# Patient Record
Sex: FEMALE | Race: BLACK OR AFRICAN AMERICAN | NOT HISPANIC OR LATINO | Employment: OTHER | URBAN - METROPOLITAN AREA
[De-identification: names, ages, dates, MRNs, and addresses within clinical notes are randomized per-mention and may not be internally consistent; named-entity substitution may affect disease eponyms.]

---

## 2019-11-05 ENCOUNTER — OFFICE VISIT (OUTPATIENT)
Dept: UROLOGY | Facility: CLINIC | Age: 74
End: 2019-11-05
Payer: COMMERCIAL

## 2019-11-05 DIAGNOSIS — R32 FEMALE INCONTINENCE: Primary | ICD-10-CM

## 2019-11-05 LAB
BILIRUB UR QL STRIP: NEGATIVE
GLUCOSE UR QL STRIP: NEGATIVE
KETONES UR QL STRIP: NEGATIVE
LEUKOCYTE ESTERASE UR QL STRIP: POSITIVE
PH, POC UA: 5.5
POC AMORP, URINE: ABNORMAL
POC BACTI, URINE: ABNORMAL
POC BLOOD, URINE: POSITIVE
POC CASTS, URINE: ABNORMAL
POC CRYST, URINE: ABNORMAL
POC EPITH, URINE: ABNORMAL
POC HCG, URINE: ABNORMAL
POC HYALIN, URINE: ABNORMAL
POC MUCUS, URINE: ABNORMAL
POC NITRATES, URINE: NEGATIVE
POC OTHER, URINE: ABNORMAL
POC RBC, URINE: ABNORMAL HPF
POC RESIDUAL URINE VOLUME: 63 ML (ref 0–100)
POC WBC, URINE: ABNORMAL HPF
PROT UR QL STRIP: POSITIVE
SP GR UR STRIP: >=1.03 (ref 1–1.03)
UROBILINOGEN UR STRIP-ACNC: 0.2 (ref 0.1–1.1)

## 2019-11-05 PROCEDURE — 51798 US URINE CAPACITY MEASURE: CPT | Mod: S$GLB,,, | Performed by: UROLOGY

## 2019-11-05 PROCEDURE — 99024 POSTOP FOLLOW-UP VISIT: CPT | Mod: S$GLB,,, | Performed by: UROLOGY

## 2019-11-05 PROCEDURE — 99024 PR POST-OP FOLLOW-UP VISIT: ICD-10-PCS | Mod: S$GLB,,, | Performed by: UROLOGY

## 2019-11-05 PROCEDURE — 81000 POCT URINALYSIS (W/MICRO OPTION): ICD-10-PCS | Mod: S$GLB,,, | Performed by: UROLOGY

## 2019-11-05 PROCEDURE — 81000 URINALYSIS NONAUTO W/SCOPE: CPT | Mod: S$GLB,,, | Performed by: UROLOGY

## 2019-11-05 PROCEDURE — 51798 POCT BLADDER SCAN: ICD-10-PCS | Mod: S$GLB,,, | Performed by: UROLOGY

## 2019-11-05 RX ORDER — NITROFURANTOIN 25; 75 MG/1; MG/1
CAPSULE ORAL
Refills: 0 | COMMUNITY
Start: 2019-10-11 | End: 2020-11-09

## 2019-11-05 RX ORDER — AMLODIPINE BESYLATE 5 MG/1
TABLET ORAL
Refills: 0 | COMMUNITY
Start: 2019-10-18 | End: 2020-11-09

## 2019-11-05 RX ORDER — NAPROXEN 500 MG/1
TABLET ORAL
Refills: 0 | COMMUNITY
Start: 2019-08-29 | End: 2020-11-09

## 2019-11-05 RX ORDER — DIAZEPAM 5 MG/1
TABLET ORAL
Refills: 0 | COMMUNITY
Start: 2019-10-11 | End: 2020-11-09

## 2019-11-05 RX ORDER — METHYLPREDNISOLONE 4 MG/1
TABLET ORAL
Refills: 0 | COMMUNITY
Start: 2019-08-29 | End: 2020-11-09

## 2019-11-05 RX ORDER — FUROSEMIDE 20 MG/1
TABLET ORAL
Refills: 3 | COMMUNITY
Start: 2019-10-02 | End: 2020-11-09

## 2019-11-05 RX ORDER — OXYCODONE AND ACETAMINOPHEN 5; 325 MG/1; MG/1
TABLET ORAL
Refills: 0 | COMMUNITY
Start: 2019-10-11 | End: 2020-11-09

## 2019-11-05 RX ORDER — LISINOPRIL 5 MG/1
TABLET ORAL
Refills: 3 | COMMUNITY
Start: 2019-10-04 | End: 2020-11-09

## 2019-11-05 RX ORDER — ACETAMINOPHEN AND CODEINE PHOSPHATE 300; 30 MG/1; MG/1
TABLET ORAL
Refills: 0 | COMMUNITY
Start: 2019-09-27 | End: 2020-11-09

## 2019-11-05 NOTE — PROGRESS NOTES
Subjective:       Patient ID: Jennifer Young is a 74 y.o. female.    Chief Complaint: Other (fu from sling 10/3/19)      HPI: Post opp sling etc      Past Medical History:   Past Medical History:   Diagnosis Date    Acid reflux     Carpal tunnel syndrome of left wrist     H/O knee surgery     Hypertension        Past Surgical Historical:   Past Surgical History:   Procedure Laterality Date    BACK SURGERY      BLADDER SUSPENSION      HAND SURGERY      KNEE SURGERY      right replaced         Medications:   Medication List with Changes/Refills   Current Medications    ACETAMINOPHEN-CODEINE 300-30MG (TYLENOL #3) 300-30 MG TAB    TK 1 T PO  Q 4 H PRN P    AMLODIPINE (NORVASC) 5 MG TABLET    TK 1 T PO QAM    DIAZEPAM (VALIUM) 5 MG TABLET    TK 1 T PO  Q 8 H PRN FOR BLADDER SPASM    FUROSEMIDE (LASIX) 20 MG TABLET    TK 1 T PO ONCE A DAY    LISINOPRIL (PRINIVIL,ZESTRIL) 5 MG TABLET    TK 1 T PO ONCE D    METHYLPREDNISOLONE (MEDROL DOSEPACK) 4 MG TABLET    FPD    NAPROXEN (NAPROSYN) 500 MG TABLET    TK 1 T PO  BID    NITROFURANTOIN, MACROCRYSTAL-MONOHYDRATE, (MACROBID) 100 MG CAPSULE    TK ONE C PO BID    OXYCODONE-ACETAMINOPHEN (PERCOCET) 5-325 MG PER TABLET    TK 1 T PO Q 6 H PRN P        Past Social History:   Social History     Socioeconomic History    Marital status:      Spouse name: Not on file    Number of children: Not on file    Years of education: Not on file    Highest education level: Not on file   Occupational History    Not on file   Social Needs    Financial resource strain: Not on file    Food insecurity:     Worry: Not on file     Inability: Not on file    Transportation needs:     Medical: Not on file     Non-medical: Not on file   Tobacco Use    Smoking status: Never Smoker   Substance and Sexual Activity    Alcohol use: Not on file    Drug use: Never    Sexual activity: Not Currently   Lifestyle    Physical activity:     Days per week: Not on file     Minutes per  session: Not on file    Stress: Not on file   Relationships    Social connections:     Talks on phone: Not on file     Gets together: Not on file     Attends Confucianism service: Not on file     Active member of club or organization: Not on file     Attends meetings of clubs or organizations: Not on file     Relationship status: Not on file   Other Topics Concern    Not on file   Social History Narrative    Not on file       Allergies: Review of patient's allergies indicates:  No Known Allergies     Family History:   Family History   Problem Relation Age of Onset    Cancer Father     Lung cancer Mother     Breast cancer Sister         Review of Systems:  Review of Systems   Constitutional: Negative for activity change and appetite change.   HENT: Negative for congestion and dental problem.    Respiratory: Negative for chest tightness and shortness of breath.    Cardiovascular: Negative for chest pain.   Gastrointestinal: Negative for abdominal distention.   Genitourinary: Negative for decreased urine volume, difficulty urinating, dyspareunia, dysuria, enuresis, flank pain, frequency, genital sores, hematuria, pelvic pain and urgency.   Musculoskeletal: Negative for back pain and neck pain.   Neurological: Negative for dizziness.   Hematological: Negative for adenopathy.   Psychiatric/Behavioral: Negative for agitation, behavioral problems and confusion.       Physical Exam:  Physical Exam   Nursing note and vitals reviewed.  Constitutional: She is oriented to person, place, and time. She appears well-developed and well-nourished.   HENT:   Head: Normocephalic.   Cardiovascular: Normal rate, regular rhythm and normal heart sounds.    Pulmonary/Chest: Effort normal and breath sounds normal.   Abdominal: Soft. Bowel sounds are normal.   Genitourinary:       Pelvic exam was performed with patient prone.   Neurological: She is alert and oriented to person, place, and time.   Skin: Skin is warm and dry.          Assessment/Plan:     sp sling etc doing well fu 4 weeks  Problem List Items Addressed This Visit     None

## 2019-12-03 ENCOUNTER — OFFICE VISIT (OUTPATIENT)
Dept: UROLOGY | Facility: CLINIC | Age: 74
End: 2019-12-03
Payer: COMMERCIAL

## 2019-12-03 VITALS
WEIGHT: 240 LBS | DIASTOLIC BLOOD PRESSURE: 80 MMHG | SYSTOLIC BLOOD PRESSURE: 125 MMHG | BODY MASS INDEX: 40.97 KG/M2 | HEIGHT: 64 IN | HEART RATE: 65 BPM

## 2019-12-03 DIAGNOSIS — N81.6 RECTOCELE: ICD-10-CM

## 2019-12-03 DIAGNOSIS — R39.15 URINARY URGENCY: ICD-10-CM

## 2019-12-03 DIAGNOSIS — N39.3 STRESS INCONTINENCE: Primary | ICD-10-CM

## 2019-12-03 DIAGNOSIS — N81.10 FEMALE CYSTOCELE: ICD-10-CM

## 2019-12-03 LAB
BILIRUB UR QL STRIP: NEGATIVE
GLUCOSE UR QL STRIP: NEGATIVE
KETONES UR QL STRIP: NEGATIVE
LEUKOCYTE ESTERASE UR QL STRIP: NEGATIVE
PH, POC UA: 5.5
POC AMORP, URINE: ABNORMAL
POC BACTI, URINE: ABNORMAL
POC BLOOD, URINE: POSITIVE
POC CASTS, URINE: ABNORMAL
POC CRYST, URINE: ABNORMAL
POC EPITH, URINE: ABNORMAL
POC HCG, URINE: ABNORMAL
POC HYALIN, URINE: ABNORMAL
POC MUCUS, URINE: ABNORMAL
POC NITRATES, URINE: NEGATIVE
POC OTHER, URINE: ABNORMAL
POC RBC, URINE: ABNORMAL HPF
POC WBC, URINE: ABNORMAL HPF
PROT UR QL STRIP: POSITIVE
SP GR UR STRIP: 1.02 (ref 1–1.03)
UROBILINOGEN UR STRIP-ACNC: 0.2 (ref 0.1–1.1)

## 2019-12-03 PROCEDURE — 99024 PR POST-OP FOLLOW-UP VISIT: ICD-10-PCS | Mod: S$GLB,,, | Performed by: NURSE PRACTITIONER

## 2019-12-03 PROCEDURE — 99024 POSTOP FOLLOW-UP VISIT: CPT | Mod: S$GLB,,, | Performed by: NURSE PRACTITIONER

## 2019-12-03 RX ORDER — OXYBUTYNIN CHLORIDE 5 MG/1
5 TABLET, EXTENDED RELEASE ORAL DAILY
Qty: 30 TABLET | Refills: 11 | Status: SHIPPED | OUTPATIENT
Start: 2019-12-03 | End: 2020-11-09

## 2019-12-03 NOTE — PROGRESS NOTES
Subjective:       Patient ID: Jennifer Young is a 74 y.o. female.    Chief Complaint: Other (fu from sling/ leaks when getting up )      HPI: 74-year-old female presents for a week postop sling with anterior and posterior repair, secondary to stress incontinence, cystocele, and rectocele.  Patient states she is doing well, for the most part.  Patient does states she will have episodes of leakage.  States she will get up at night, and when she stands up she will have some leakage.  She states that she is laying on the couch and she gets up, she will have some leakage.  Patient also states that when at [employer] she needs to sit near the bathroom, or she will not get there in time.  Denies any pain or burning with urination.  Denies any difficulty voiding.  Denies any odor.  Denies seeing blood.  No other urinary complaints.  All other health problems are stable.      Past Medical History:   Past Medical History:   Diagnosis Date    Acid reflux     Carpal tunnel syndrome of left wrist     H/O knee surgery     Hypertension        Past Surgical Historical:   Past Surgical History:   Procedure Laterality Date    BACK SURGERY      BLADDER SUSPENSION      BLADDER SUSPENSION      HAND SURGERY      KNEE SURGERY      right replaced         Medications:   Medication List with Changes/Refills   New Medications    OXYBUTYNIN (DITROPAN-XL) 5 MG TR24    Take 1 tablet (5 mg total) by mouth once daily.   Current Medications    ACETAMINOPHEN-CODEINE 300-30MG (TYLENOL #3) 300-30 MG TAB    TK 1 T PO  Q 4 H PRN P    AMLODIPINE (NORVASC) 5 MG TABLET    TK 1 T PO QAM    DIAZEPAM (VALIUM) 5 MG TABLET    TK 1 T PO  Q 8 H PRN FOR BLADDER SPASM    FUROSEMIDE (LASIX) 20 MG TABLET    TK 1 T PO ONCE A DAY    LISINOPRIL (PRINIVIL,ZESTRIL) 5 MG TABLET    TK 1 T PO ONCE D    METHYLPREDNISOLONE (MEDROL DOSEPACK) 4 MG TABLET    FPD    NAPROXEN (NAPROSYN) 500 MG TABLET    TK 1 T PO  BID    NITROFURANTOIN, MACROCRYSTAL-MONOHYDRATE,  (MACROBID) 100 MG CAPSULE    TK ONE C PO BID    OXYCODONE-ACETAMINOPHEN (PERCOCET) 5-325 MG PER TABLET    TK 1 T PO Q 6 H PRN P        Past Social History:   Social History     Socioeconomic History    Marital status:      Spouse name: Not on file    Number of children: Not on file    Years of education: Not on file    Highest education level: Not on file   Occupational History    Not on file   Social Needs    Financial resource strain: Not on file    Food insecurity:     Worry: Not on file     Inability: Not on file    Transportation needs:     Medical: Not on file     Non-medical: Not on file   Tobacco Use    Smoking status: Never Smoker   Substance and Sexual Activity    Alcohol use: Not on file    Drug use: Never    Sexual activity: Not Currently   Lifestyle    Physical activity:     Days per week: Not on file     Minutes per session: Not on file    Stress: Not on file   Relationships    Social connections:     Talks on phone: Not on file     Gets together: Not on file     Attends [employer] service: Not on file     Active member of club or organization: Not on file     Attends meetings of clubs or organizations: Not on file     Relationship status: Not on file   Other Topics Concern    Not on file   Social History Narrative    Not on file       Allergies: Review of patient's allergies indicates:  No Known Allergies     Family History:   Family History   Problem Relation Age of Onset    Cancer Father     Lung cancer Mother     Breast cancer Sister         Review of Systems:  Review of Systems   Constitutional: Negative for activity change and appetite change.        Ambulates with a walker.   HENT: Negative for congestion and dental problem.    Respiratory: Negative for chest tightness and shortness of breath.    Cardiovascular: Negative for chest pain.   Gastrointestinal: Negative for abdominal distention and abdominal pain.   Genitourinary: Positive for urgency. Negative for decreased  urine volume, difficulty urinating, dyspareunia, dysuria, enuresis, flank pain, frequency, genital sores, hematuria and pelvic pain.   Musculoskeletal: Negative for back pain and neck pain.   Allergic/Immunologic: Negative for immunocompromised state.   Neurological: Negative for dizziness.   Hematological: Negative for adenopathy.   Psychiatric/Behavioral: Negative for agitation, behavioral problems and confusion.       Physical Exam:  Physical Exam   Nursing note and vitals reviewed.  Constitutional: She is oriented to person, place, and time. She appears well-developed and well-nourished.   HENT:   Head: Normocephalic.   Eyes: Pupils are equal, round, and reactive to light.   Neck: Normal range of motion. Neck supple.   Cardiovascular: Normal rate, regular rhythm and normal heart sounds.    Pulmonary/Chest: Effort normal and breath sounds normal.   Abdominal: Soft. Bowel sounds are normal.   Genitourinary:   Genitourinary Comments: On pelvic exam, patient has good support.   Musculoskeletal: Normal range of motion.   Neurological: She is alert and oriented to person, place, and time.   Skin: Skin is warm and dry.     Psychiatric: She has a normal mood and affect. Her behavior is normal.     Bladder scan:  63 cc  Urinalysis:  Trace blood, red blood cells 0-2.  White blood cells 0-2, +1 epithelials, +1 bacteria.    Assessment/Plan:   1.  Stress incontinence with cystocele and rectocele:  Will put the patient on Ditropan 5 mg a day.  2.  Urgency:  We discussed timed voiding and double voiding.  Also discussed not holding her bladder.  Jaime scheduled for follow-up in 4 weeks for re-evaluation.  Problem List Items Addressed This Visit     None      Visit Diagnoses     Stress incontinence    -  Primary    Relevant Medications    oxybutynin (DITROPAN-XL) 5 MG TR24    Other Relevant Orders    POCT Urinalysis (w/Micro Option)    Bladder scan    Female cystocele        Rectocele        Urinary urgency

## 2020-01-27 ENCOUNTER — OFFICE VISIT (OUTPATIENT)
Dept: UROLOGY | Facility: CLINIC | Age: 75
End: 2020-01-27
Payer: COMMERCIAL

## 2020-01-27 VITALS
BODY MASS INDEX: 40.97 KG/M2 | HEART RATE: 85 BPM | HEIGHT: 64 IN | WEIGHT: 240 LBS | DIASTOLIC BLOOD PRESSURE: 88 MMHG | SYSTOLIC BLOOD PRESSURE: 126 MMHG

## 2020-01-27 DIAGNOSIS — N39.3 STRESS INCONTINENCE: Primary | ICD-10-CM

## 2020-01-27 LAB
BILIRUB SERPL-MCNC: NEGATIVE MG/DL
BLOOD URINE, POC: NEGATIVE
COLOR, POC UA: NORMAL
GLUCOSE UR QL STRIP: NEGATIVE
KETONES UR QL STRIP: NORMAL
LEUKOCYTE ESTERASE URINE, POC: NEGATIVE
NITRITE, POC UA: NEGATIVE
PH, POC UA: 5
POC RESIDUAL URINE VOLUME: 46 ML (ref 0–100)
PROTEIN, POC: NORMAL
SPECIFIC GRAVITY, POC UA: >=1.03
UROBILINOGEN, POC UA: 0.2

## 2020-01-27 PROCEDURE — 51798 US URINE CAPACITY MEASURE: CPT | Mod: S$GLB,,, | Performed by: UROLOGY

## 2020-01-27 PROCEDURE — 1159F MED LIST DOCD IN RCRD: CPT | Mod: S$GLB,,, | Performed by: UROLOGY

## 2020-01-27 PROCEDURE — 99213 OFFICE O/P EST LOW 20 MIN: CPT | Mod: 25,S$GLB,, | Performed by: UROLOGY

## 2020-01-27 PROCEDURE — 51798 POCT BLADDER SCAN: ICD-10-PCS | Mod: S$GLB,,, | Performed by: UROLOGY

## 2020-01-27 PROCEDURE — 1159F PR MEDICATION LIST DOCUMENTED IN MEDICAL RECORD: ICD-10-PCS | Mod: S$GLB,,, | Performed by: UROLOGY

## 2020-01-27 PROCEDURE — 99213 PR OFFICE/OUTPT VISIT, EST, LEVL III, 20-29 MIN: ICD-10-PCS | Mod: 25,S$GLB,, | Performed by: UROLOGY

## 2020-01-27 PROCEDURE — 81002 POCT URINE DIPSTICK WITHOUT MICROSCOPE: ICD-10-PCS | Mod: S$GLB,,, | Performed by: UROLOGY

## 2020-01-27 PROCEDURE — 81002 URINALYSIS NONAUTO W/O SCOPE: CPT | Mod: S$GLB,,, | Performed by: UROLOGY

## 2020-01-27 NOTE — PROGRESS NOTES
Subjective:       Patient ID: Jennifer Young is a 74 y.o. female.    Chief Complaint: Follow-up (4 wk f/u) and Other (ditropan 5 mg not effectibe for pt, pt also believes she might have slight reaction to ditropan has been having slight itch.)      HPI: 75 yo female still leaking urine--she has had a sling etc.  Placed on low dose ditropan with no improvement--actually has side effects from med       Past Medical History:   Past Medical History:   Diagnosis Date    Acid reflux     Carpal tunnel syndrome of left wrist     H/O knee surgery     Hypertension        Past Surgical Historical:   Past Surgical History:   Procedure Laterality Date    BACK SURGERY      BLADDER SUSPENSION      BLADDER SUSPENSION      HAND SURGERY      KNEE SURGERY      right replaced         Medications:   Medication List with Changes/Refills   Current Medications    ACETAMINOPHEN-CODEINE 300-30MG (TYLENOL #3) 300-30 MG TAB    TK 1 T PO  Q 4 H PRN P    AMLODIPINE (NORVASC) 5 MG TABLET    TK 1 T PO QAM    DIAZEPAM (VALIUM) 5 MG TABLET    TK 1 T PO  Q 8 H PRN FOR BLADDER SPASM    FUROSEMIDE (LASIX) 20 MG TABLET    TK 1 T PO ONCE A DAY    LISINOPRIL (PRINIVIL,ZESTRIL) 5 MG TABLET    TK 1 T PO ONCE D    METHYLPREDNISOLONE (MEDROL DOSEPACK) 4 MG TABLET    FPD    NAPROXEN (NAPROSYN) 500 MG TABLET    TK 1 T PO  BID    NITROFURANTOIN, MACROCRYSTAL-MONOHYDRATE, (MACROBID) 100 MG CAPSULE    TK ONE C PO BID    OXYBUTYNIN (DITROPAN-XL) 5 MG TR24    Take 1 tablet (5 mg total) by mouth once daily.    OXYCODONE-ACETAMINOPHEN (PERCOCET) 5-325 MG PER TABLET    TK 1 T PO Q 6 H PRN P        Past Social History:   Social History     Socioeconomic History    Marital status:      Spouse name: Not on file    Number of children: Not on file    Years of education: Not on file    Highest education level: Not on file   Occupational History    Not on file   Social Needs    Financial resource strain: Not on file    Food insecurity:      Worry: Not on file     Inability: Not on file    Transportation needs:     Medical: Not on file     Non-medical: Not on file   Tobacco Use    Smoking status: Never Smoker   Substance and Sexual Activity    Alcohol use: Not on file    Drug use: Never    Sexual activity: Not Currently   Lifestyle    Physical activity:     Days per week: Not on file     Minutes per session: Not on file    Stress: Not on file   Relationships    Social connections:     Talks on phone: Not on file     Gets together: Not on file     Attends Yazidism service: Not on file     Active member of club or organization: Not on file     Attends meetings of clubs or organizations: Not on file     Relationship status: Not on file   Other Topics Concern    Not on file   Social History Narrative    Not on file       Allergies: Review of patient's allergies indicates:  No Known Allergies     Family History:   Family History   Problem Relation Age of Onset    Cancer Father     Lung cancer Mother     Breast cancer Sister         Review of Systems:  Review of Systems   Constitutional: Negative for activity change and appetite change.   HENT: Negative for congestion and dental problem.    Respiratory: Negative for chest tightness and shortness of breath.    Cardiovascular: Negative for chest pain.   Gastrointestinal: Negative for abdominal distention.   Genitourinary: Negative for decreased urine volume, difficulty urinating, dyspareunia, dysuria, enuresis, flank pain, frequency, genital sores, hematuria, pelvic pain and urgency.   Musculoskeletal: Negative for back pain and neck pain.   Allergic/Immunologic: Negative for immunocompromised state.   Neurological: Negative for dizziness.   Hematological: Negative for adenopathy.   Psychiatric/Behavioral: Negative for agitation, behavioral problems and confusion.       Physical Exam:  Physical Exam   Nursing note and vitals reviewed.  Constitutional: She is oriented to person, place, and time.  She appears well-developed and well-nourished.   HENT:   Head: Normocephalic.   Cardiovascular: Normal rate, regular rhythm and normal heart sounds.    Pulmonary/Chest: Effort normal and breath sounds normal.   Abdominal: Soft. Bowel sounds are normal.   Neurological: She is alert and oriented to person, place, and time.   Skin: Skin is warm and dry.     ua is normal    Assessment/Plan:     continued incontinence--will change to myrbetriq continued timed voiding fu in 3 weeks will repeat pelvic that day  Problem List Items Addressed This Visit     None      Visit Diagnoses     Stress incontinence    -  Primary    Relevant Orders    POCT Bladder Scan (Completed)    POCT urine dipstick without microscope

## 2020-02-17 ENCOUNTER — OFFICE VISIT (OUTPATIENT)
Dept: UROLOGY | Facility: CLINIC | Age: 75
End: 2020-02-17
Payer: COMMERCIAL

## 2020-02-17 VITALS
HEART RATE: 75 BPM | SYSTOLIC BLOOD PRESSURE: 118 MMHG | BODY MASS INDEX: 40.97 KG/M2 | HEIGHT: 64 IN | WEIGHT: 240 LBS | DIASTOLIC BLOOD PRESSURE: 80 MMHG

## 2020-02-17 DIAGNOSIS — N39.3 STRESS INCONTINENCE: Primary | ICD-10-CM

## 2020-02-17 PROCEDURE — 1159F PR MEDICATION LIST DOCUMENTED IN MEDICAL RECORD: ICD-10-PCS | Mod: S$GLB,,, | Performed by: UROLOGY

## 2020-02-17 PROCEDURE — 99214 OFFICE O/P EST MOD 30 MIN: CPT | Mod: S$GLB,,, | Performed by: UROLOGY

## 2020-02-17 PROCEDURE — 99214 PR OFFICE/OUTPT VISIT, EST, LEVL IV, 30-39 MIN: ICD-10-PCS | Mod: S$GLB,,, | Performed by: UROLOGY

## 2020-02-17 PROCEDURE — 1159F MED LIST DOCD IN RCRD: CPT | Mod: S$GLB,,, | Performed by: UROLOGY

## 2020-02-17 NOTE — PROGRESS NOTES
Subjective:       Patient ID: Jennifer Young is a 74 y.o. female.    Chief Complaint: Other (3 week fu mybetriq )      HPI: 4-year-old female, patient Dr. Cheek, presents for 3 week re-evaluation.  Patient has a history of stress incontinence.  She had a sling in October of 2019.  Patient was complaining of still have some incontinence after the procedure.    She was put on 5 mg Ditropan.  However, she was having itching.  Therefore, she was put on Myrbetriq 25 mg approximately 3 weeks ago.  Patient states that during the day she tends to do very well.  However at night any time she gets up she will have leakage.  The Myrbetriq has helped a little but not very much.    Patient denies pain or burning urination.  States he has a good stream.  Denies difficulty voiding.  Denies fever.  Denies flank pain.  Denies blood in her urine.  No other urinary complaints, all other health problems appear stable at this time.       Past Medical History:   Past Medical History:   Diagnosis Date    Acid reflux     Carpal tunnel syndrome of left wrist     H/O knee surgery     Hypertension        Past Surgical Historical:   Past Surgical History:   Procedure Laterality Date    APPENDECTOMY      BACK SURGERY      BLADDER SUSPENSION      BLADDER SUSPENSION      HAND SURGERY      KNEE SURGERY      right replaced     SMALL INTESTINE SURGERY          Medications:   Medication List with Changes/Refills   Current Medications    ACETAMINOPHEN-CODEINE 300-30MG (TYLENOL #3) 300-30 MG TAB    TK 1 T PO  Q 4 H PRN P    AMLODIPINE (NORVASC) 5 MG TABLET    TK 1 T PO QAM    DIAZEPAM (VALIUM) 5 MG TABLET    TK 1 T PO  Q 8 H PRN FOR BLADDER SPASM    FUROSEMIDE (LASIX) 20 MG TABLET    TK 1 T PO ONCE A DAY    LISINOPRIL (PRINIVIL,ZESTRIL) 5 MG TABLET    TK 1 T PO ONCE D    METHYLPREDNISOLONE (MEDROL DOSEPACK) 4 MG TABLET    FPD    NAPROXEN (NAPROSYN) 500 MG TABLET    TK 1 T PO  BID    NITROFURANTOIN, MACROCRYSTAL-MONOHYDRATE,  (MACROBID) 100 MG CAPSULE    TK ONE C PO BID    OXYBUTYNIN (DITROPAN-XL) 5 MG TR24    Take 1 tablet (5 mg total) by mouth once daily.    OXYCODONE-ACETAMINOPHEN (PERCOCET) 5-325 MG PER TABLET    TK 1 T PO Q 6 H PRN P        Past Social History:   Social History     Socioeconomic History    Marital status:      Spouse name: Not on file    Number of children: Not on file    Years of education: Not on file    Highest education level: Not on file   Occupational History    Not on file   Social Needs    Financial resource strain: Not on file    Food insecurity:     Worry: Not on file     Inability: Not on file    Transportation needs:     Medical: Not on file     Non-medical: Not on file   Tobacco Use    Smoking status: Never Smoker   Substance and Sexual Activity    Alcohol use: Not on file    Drug use: Never    Sexual activity: Not Currently   Lifestyle    Physical activity:     Days per week: Not on file     Minutes per session: Not on file    Stress: Not on file   Relationships    Social connections:     Talks on phone: Not on file     Gets together: Not on file     Attends Buddhist service: Not on file     Active member of club or organization: Not on file     Attends meetings of clubs or organizations: Not on file     Relationship status: Not on file   Other Topics Concern    Not on file   Social History Narrative    Not on file       Allergies: Review of patient's allergies indicates:  No Known Allergies     Family History:   Family History   Problem Relation Age of Onset    Cancer Father     Lung cancer Mother     Breast cancer Sister         Review of Systems:  Review of Systems   Constitutional: Negative for activity change and appetite change.   HENT: Negative for congestion and dental problem.    Respiratory: Negative for chest tightness and shortness of breath.    Cardiovascular: Negative for chest pain.   Gastrointestinal: Negative for abdominal distention and abdominal pain.    Genitourinary: Positive for urgency. Negative for decreased urine volume, difficulty urinating, dyspareunia, dysuria, enuresis, flank pain, frequency, genital sores, hematuria and pelvic pain.   Musculoskeletal: Negative for back pain and neck pain.   Allergic/Immunologic: Negative for immunocompromised state.   Neurological: Negative for dizziness.   Hematological: Negative for adenopathy.   Psychiatric/Behavioral: Negative for agitation, behavioral problems and confusion.       Physical Exam:  Physical Exam   Nursing note and vitals reviewed.  Constitutional: She is oriented to person, place, and time. She appears well-developed and well-nourished.   HENT:   Head: Normocephalic.   Eyes: Pupils are equal, round, and reactive to light.   Neck: Normal range of motion. Neck supple.   Cardiovascular: Normal rate, regular rhythm and normal heart sounds.    Pulmonary/Chest: Effort normal and breath sounds normal.   Abdominal: Soft. Bowel sounds are normal.   Genitourinary:   Genitourinary Comments: On pelvic exam, patient has good support.   Musculoskeletal: Normal range of motion.   Neurological: She is alert and oriented to person, place, and time.   Skin: Skin is warm and dry.     Psychiatric: She has a normal mood and affect. Her behavior is normal.     PVR:  50 cc.    Assessment/Plan:   1.  Stress incontinence:  I discussed timed voiding total of with the patient. We also discussed decreasing evening fluids.  Discussed decreasing salt and sodium.  We are going to increase her Myrbetriq from 25 mg to 50 mg.    Will plan follow-up in 4 weeks, sooner if needed.  Problem List Items Addressed This Visit     None      Visit Diagnoses     Stress incontinence    -  Primary

## 2020-03-16 ENCOUNTER — OFFICE VISIT (OUTPATIENT)
Dept: UROLOGY | Facility: CLINIC | Age: 75
End: 2020-03-16
Payer: COMMERCIAL

## 2020-03-16 VITALS
SYSTOLIC BLOOD PRESSURE: 136 MMHG | HEIGHT: 64 IN | RESPIRATION RATE: 18 BRPM | BODY MASS INDEX: 40.97 KG/M2 | DIASTOLIC BLOOD PRESSURE: 84 MMHG | WEIGHT: 240 LBS

## 2020-03-16 DIAGNOSIS — N39.3 STRESS INCONTINENCE: Primary | ICD-10-CM

## 2020-03-16 PROCEDURE — 1159F PR MEDICATION LIST DOCUMENTED IN MEDICAL RECORD: ICD-10-PCS | Mod: S$GLB,,, | Performed by: NURSE PRACTITIONER

## 2020-03-16 PROCEDURE — 1159F MED LIST DOCD IN RCRD: CPT | Mod: S$GLB,,, | Performed by: NURSE PRACTITIONER

## 2020-03-16 PROCEDURE — 99213 PR OFFICE/OUTPT VISIT, EST, LEVL III, 20-29 MIN: ICD-10-PCS | Mod: S$GLB,,, | Performed by: NURSE PRACTITIONER

## 2020-03-16 PROCEDURE — 99213 OFFICE O/P EST LOW 20 MIN: CPT | Mod: S$GLB,,, | Performed by: NURSE PRACTITIONER

## 2020-03-16 NOTE — PROGRESS NOTES
Subjective:       Patient ID: Jennifer Young is a 74 y.o. female.    Chief Complaint: 4 wk f/u (stlight improvment after increasing to myrbetriq 50 mg)      HPI: 74-year-old female, patient Dr. Cheek, presents for 4 week re-evaluation.  Patient had a sling in October 2019.  After the sling patient is still having some leakage.  Patient was put on some Ditropan, however she was having a reaction to that medication.  Patient was put on 25 mg of Myrbetriq.  She had some improvement with the 25 mg Myrbetriq.  However, she is still having episodes of leakage at night.  Patient was then put on 50 mg of Myrbetriq.  Patient presents today stating the higher dose is working better for her.  Patient states she will still have minor leakage.  She states that during the day she might notice a small amount of leakage.  At night, she states that before she would have leakage when she got up.  She states now she will still have a little bit of leakage but is very minor than before.  Patient states he is happy with the results of the 50 mg Myrbetriq.    Patient denies pain or burning urination.  Denies difficulty voiding.  Denies odor to her urine.  Denies blood in her urine.  Denies fever.  No other urinary complaints.  All other health problems appear stable at this time.       Past Medical History:   Past Medical History:   Diagnosis Date    Acid reflux     Carpal tunnel syndrome of left wrist     H/O knee surgery     Hypertension        Past Surgical Historical:   Past Surgical History:   Procedure Laterality Date    APPENDECTOMY      BACK SURGERY      BLADDER SUSPENSION      BLADDER SUSPENSION      HAND SURGERY      KNEE SURGERY      right replaced     SMALL INTESTINE SURGERY          Medications:   Medication List with Changes/Refills   New Medications    MIRABEGRON (MYRBETRIQ) 50 MG TB24    Take 1 tablet (50 mg total) by mouth once daily.   Current Medications    ACETAMINOPHEN-CODEINE 300-30MG  (TYLENOL #3) 300-30 MG TAB    TK 1 T PO  Q 4 H PRN P    AMLODIPINE (NORVASC) 5 MG TABLET    TK 1 T PO QAM    DIAZEPAM (VALIUM) 5 MG TABLET    TK 1 T PO  Q 8 H PRN FOR BLADDER SPASM    FUROSEMIDE (LASIX) 20 MG TABLET    TK 1 T PO ONCE A DAY    LISINOPRIL (PRINIVIL,ZESTRIL) 5 MG TABLET    TK 1 T PO ONCE D    METHYLPREDNISOLONE (MEDROL DOSEPACK) 4 MG TABLET    FPD    NAPROXEN (NAPROSYN) 500 MG TABLET    TK 1 T PO  BID    NITROFURANTOIN, MACROCRYSTAL-MONOHYDRATE, (MACROBID) 100 MG CAPSULE    TK ONE C PO BID    OXYBUTYNIN (DITROPAN-XL) 5 MG TR24    Take 1 tablet (5 mg total) by mouth once daily.    OXYCODONE-ACETAMINOPHEN (PERCOCET) 5-325 MG PER TABLET    TK 1 T PO Q 6 H PRN P        Past Social History:   Social History     Socioeconomic History    Marital status:      Spouse name: Not on file    Number of children: Not on file    Years of education: Not on file    Highest education level: Not on file   Occupational History    Not on file   Social Needs    Financial resource strain: Not on file    Food insecurity:     Worry: Not on file     Inability: Not on file    Transportation needs:     Medical: Not on file     Non-medical: Not on file   Tobacco Use    Smoking status: Never Smoker   Substance and Sexual Activity    Alcohol use: Not on file    Drug use: Never    Sexual activity: Not Currently   Lifestyle    Physical activity:     Days per week: Not on file     Minutes per session: Not on file    Stress: Not on file   Relationships    Social connections:     Talks on phone: Not on file     Gets together: Not on file     Attends Sabianist service: Not on file     Active member of club or organization: Not on file     Attends meetings of clubs or organizations: Not on file     Relationship status: Not on file   Other Topics Concern    Not on file   Social History Narrative    Not on file       Allergies: Review of patient's allergies indicates:  No Known Allergies     Family History:   Family  History   Problem Relation Age of Onset    Cancer Father     Lung cancer Mother     Breast cancer Sister         Review of Systems:  Review of Systems   Constitutional: Negative for activity change and appetite change.   HENT: Negative for congestion and dental problem.    Respiratory: Negative for chest tightness and shortness of breath.    Cardiovascular: Negative for chest pain.   Gastrointestinal: Negative for abdominal distention.   Genitourinary: Negative for decreased urine volume, difficulty urinating, dyspareunia, dysuria, enuresis, flank pain, frequency, genital sores, hematuria, pelvic pain and urgency.   Musculoskeletal: Negative for back pain and neck pain.   Allergic/Immunologic: Negative for immunocompromised state.   Neurological: Negative for dizziness.   Hematological: Negative for adenopathy.   Psychiatric/Behavioral: Negative for agitation, behavioral problems and confusion.       Physical Exam:  Physical Exam   Nursing note and vitals reviewed.  Constitutional: She is oriented to person, place, and time. She appears well-developed and well-nourished.   HENT:   Head: Normocephalic.   Cardiovascular: Normal rate, regular rhythm and normal heart sounds.    Pulmonary/Chest: Effort normal and breath sounds normal.   Abdominal: Soft. Bowel sounds are normal.   Neurological: She is alert and oriented to person, place, and time.   Skin: Skin is warm and dry.      Urinalysis:  Protein 30, otherwise normal.    Assessment/Plan:   Stress incontinence:  Patient expresses continued improvement with higher dose of Myrbetriq.  Patient states she is happy with results.  Will continue Myrbetriq 50 mg.  [facility].  Did discuss decreasing evening fluids.  Discussed timed voiding and double voiding.  Discussed effects of soda, T, caffeine.  Also discussed salt/sodium.    Will plan follow-up in 6 months, sooner if needed.  Problem List Items Addressed This Visit     None       Visit Diagnoses     Stress incontinence    -  Primary    Relevant Medications    mirabegron (MYRBETRIQ) 50 mg Tb24

## 2020-09-17 ENCOUNTER — OFFICE VISIT (OUTPATIENT)
Dept: UROLOGY | Facility: CLINIC | Age: 75
End: 2020-09-17
Payer: COMMERCIAL

## 2020-09-17 DIAGNOSIS — R30.0 DYSURIA: Primary | ICD-10-CM

## 2020-09-17 DIAGNOSIS — N39.41 URGE INCONTINENCE: ICD-10-CM

## 2020-09-17 PROCEDURE — 1159F MED LIST DOCD IN RCRD: CPT | Mod: S$GLB,,, | Performed by: UROLOGY

## 2020-09-17 PROCEDURE — 99214 PR OFFICE/OUTPT VISIT, EST, LEVL IV, 30-39 MIN: ICD-10-PCS | Mod: S$GLB,,, | Performed by: UROLOGY

## 2020-09-17 PROCEDURE — 99214 OFFICE O/P EST MOD 30 MIN: CPT | Mod: S$GLB,,, | Performed by: UROLOGY

## 2020-09-17 PROCEDURE — 1159F PR MEDICATION LIST DOCUMENTED IN MEDICAL RECORD: ICD-10-PCS | Mod: S$GLB,,, | Performed by: UROLOGY

## 2020-09-17 RX ORDER — ESOMEPRAZOLE MAGNESIUM 10 MG/1
10 GRANULE, FOR SUSPENSION, EXTENDED RELEASE ORAL
COMMUNITY

## 2020-09-17 NOTE — PROGRESS NOTES
Subjective:       Patient ID: Jennifer Young is a 75 y.o. female.    Chief Complaint: Other (6 month fu/ leaking at night )      HPI: 76 yo female with history of urge incontinence.  Was on myrbetriq 50 with good results but no longer working well.  Pt also has history of stress incontinence rectified by surgery.  Denies any stress incontinence       Past Medical History:   Past Medical History:   Diagnosis Date    Acid reflux     Carpal tunnel syndrome of left wrist     H/O knee surgery     Hypertension        Past Surgical Historical:   Past Surgical History:   Procedure Laterality Date    APPENDECTOMY      BACK SURGERY      BLADDER SUSPENSION      BLADDER SUSPENSION      HAND SURGERY      KNEE SURGERY      right replaced     SMALL INTESTINE SURGERY          Medications:   Medication List with Changes/Refills   Current Medications    ACETAMINOPHEN-CODEINE 300-30MG (TYLENOL #3) 300-30 MG TAB    TK 1 T PO  Q 4 H PRN P    AMLODIPINE (NORVASC) 5 MG TABLET    TK 1 T PO QAM    DIAZEPAM (VALIUM) 5 MG TABLET    TK 1 T PO  Q 8 H PRN FOR BLADDER SPASM    ESOMEPRAZOLE MAGNESIUM (NEXIUM) 10 MG SUSPENSION    Take 10 mg by mouth before breakfast.    FUROSEMIDE (LASIX) 20 MG TABLET    TK 1 T PO ONCE A DAY    LISINOPRIL (PRINIVIL,ZESTRIL) 5 MG TABLET    TK 1 T PO ONCE D    METHYLPREDNISOLONE (MEDROL DOSEPACK) 4 MG TABLET    FPD    MIRABEGRON (MYRBETRIQ) 50 MG TB24    Take 1 tablet (50 mg total) by mouth once daily.    NAPROXEN (NAPROSYN) 500 MG TABLET    TK 1 T PO  BID    NITROFURANTOIN, MACROCRYSTAL-MONOHYDRATE, (MACROBID) 100 MG CAPSULE    TK ONE C PO BID    OXYBUTYNIN (DITROPAN-XL) 5 MG TR24    Take 1 tablet (5 mg total) by mouth once daily.    OXYCODONE-ACETAMINOPHEN (PERCOCET) 5-325 MG PER TABLET    TK 1 T PO Q 6 H PRN P        Past Social History:   Social History     Socioeconomic History    Marital status:      Spouse name: Not on file    Number of children: Not on file    Years of  education: Not on file    Highest education level: Not on file   Occupational History    Not on file   Social Needs    Financial resource strain: Not on file    Food insecurity     Worry: Not on file     Inability: Not on file    Transportation needs     Medical: Not on file     Non-medical: Not on file   Tobacco Use    Smoking status: Never Smoker   Substance and Sexual Activity    Alcohol use: Not on file    Drug use: Never    Sexual activity: Not Currently   Lifestyle    Physical activity     Days per week: Not on file     Minutes per session: Not on file    Stress: Not on file   Relationships    Social connections     Talks on phone: Not on file     Gets together: Not on file     Attends Orthodox service: Not on file     Active member of club or organization: Not on file     Attends meetings of clubs or organizations: Not on file     Relationship status: Not on file   Other Topics Concern    Not on file   Social History Narrative    Not on file       Allergies: Review of patient's allergies indicates:  No Known Allergies     Family History:   Family History   Problem Relation Age of Onset    Cancer Father     Lung cancer Mother     Breast cancer Sister         Review of Systems:  Review of Systems   Constitutional: Negative for activity change and appetite change.   HENT: Negative for congestion and dental problem.    Respiratory: Negative for chest tightness and shortness of breath.    Cardiovascular: Negative for chest pain.   Gastrointestinal: Negative for abdominal distention and abdominal pain.   Genitourinary: Negative for decreased urine volume, difficulty urinating, dyspareunia, dysuria, enuresis, flank pain, frequency, genital sores, hematuria, pelvic pain and urgency.   Musculoskeletal: Negative for back pain and neck pain.   Allergic/Immunologic: Negative for immunocompromised state.   Neurological: Negative for dizziness.   Hematological: Negative for adenopathy.    Psychiatric/Behavioral: Negative for agitation, behavioral problems and confusion.       Physical Exam:  Physical Exam   Nursing note and vitals reviewed.  Constitutional: She is oriented to person, place, and time. She appears well-developed.   HENT:   Head: Normocephalic.   Eyes: Pupils are equal, round, and reactive to light.   Neck: Normal range of motion. Neck supple.   Cardiovascular: Normal rate, regular rhythm and normal heart sounds.    Pulmonary/Chest: Effort normal and breath sounds normal.   Abdominal: Soft. Bowel sounds are normal.   Musculoskeletal: Normal range of motion.   Neurological: She is alert and oriented to person, place, and time.   Skin: Skin is warm and dry.     Psychiatric: Her behavior is normal.   ua is nl    Assessment/Plan:     continued worsening urge incontinence--plan cysto  Problem List Items Addressed This Visit     None      Visit Diagnoses     Dysuria    -  Primary    Relevant Orders    POCT Urinalysis (w/Micro Option)

## 2020-09-21 ENCOUNTER — TELEPHONE (OUTPATIENT)
Dept: UROLOGY | Facility: CLINIC | Age: 75
End: 2020-09-21

## 2020-09-22 ENCOUNTER — PROCEDURE VISIT (OUTPATIENT)
Dept: UROLOGY | Facility: CLINIC | Age: 75
End: 2020-09-22
Payer: COMMERCIAL

## 2020-09-22 ENCOUNTER — CLINICAL SUPPORT (OUTPATIENT)
Dept: UROLOGY | Facility: CLINIC | Age: 75
End: 2020-09-22
Payer: COMMERCIAL

## 2020-09-22 VITALS
BODY MASS INDEX: 40.8 KG/M2 | SYSTOLIC BLOOD PRESSURE: 133 MMHG | HEIGHT: 64 IN | OXYGEN SATURATION: 99 % | RESPIRATION RATE: 18 BRPM | DIASTOLIC BLOOD PRESSURE: 95 MMHG | HEART RATE: 84 BPM | WEIGHT: 239 LBS

## 2020-09-22 DIAGNOSIS — N39.41 URGE INCONTINENCE: ICD-10-CM

## 2020-09-22 DIAGNOSIS — N39.3 STRESS INCONTINENCE: ICD-10-CM

## 2020-09-22 DIAGNOSIS — N39.3 STRESS INCONTINENCE: Primary | ICD-10-CM

## 2020-09-22 DIAGNOSIS — R30.0 DYSURIA: Primary | ICD-10-CM

## 2020-09-22 PROCEDURE — 52000 CYSTOSCOPY: ICD-10-PCS | Mod: S$GLB,,, | Performed by: UROLOGY

## 2020-09-22 PROCEDURE — 52000 CYSTOURETHROSCOPY: CPT | Mod: S$GLB,,, | Performed by: UROLOGY

## 2020-09-22 RX ORDER — GENTAMICIN SULFATE 40 MG/ML
80 INJECTION, SOLUTION INTRAMUSCULAR; INTRAVENOUS
Status: DISCONTINUED | OUTPATIENT
Start: 2020-09-22 | End: 2020-09-22

## 2020-09-22 NOTE — PATIENT INSTRUCTIONS
Cystoscopy    Cystoscopy is a procedure that lets your doctor look directly inside your urethra and bladder. It can be used to:  · Help diagnose a problem with your urethra, bladder, or kidneys.  · Take a sample (biopsy) of bladder or urethral tissue.  · Treat certain problems (such as removing kidney stones).  · Place a stent to bypass an obstruction.  · Take special X-rays of the kidneys.  Based on the findings, your doctor may recommend other tests or treatments.  What is a cystoscope?  A cystoscope is a telescope-like instrument that contains lenses and fiberoptics (small glass wires that make bright light). The cystoscope may be straight and rigid, or flexible to bend around curves in the urethra. The doctor may look directly into the cystoscope, or project the image onto a monitor.  Getting ready  · Ask your doctor if you should stop taking any medicines before the procedure.  · Ask whether you should avoid eating or drinking anything after midnight before the procedure.  · Follow any other instructions your doctor gives you.  Tell your doctor before the exam if you:  · Take any medicines, such as aspirin or blood thinners  · Have allergies to any medicines  · Are pregnant   The procedure  Cystoscopy is done in the doctors office, surgery center, or hospital. The doctor and a nurse are present during the procedure. It takes only a few minutes, longer if a biopsy, X-ray, or treatment needs to be done.  During the procedure:  · You lie on an exam table on your back, knees bent and legs apart. You are covered with a drape.  · Your urethra and the area around it are washed. Anesthetic jelly may be applied to numb the urethra. Other pain medicine is usually not needed. In some cases, you may be offered a mild sedative to help you relax. If a more extensive procedure is to be done, such as a biopsy or kidney stone removal, general anesthesia may be needed.  · The cystoscope is inserted. A sterile fluid is put  into the bladder to expand it. You may feel pressure from this fluid.  · When the procedure is done, the cystoscope is removed.  After the procedure  If you had a sedative, general anesthesia, or spinal anesthesia, you must have someone drive you home. Once youre home:  · Drink plenty of fluids.  · You may have burning or light bleeding when you urinate--this is normal.  · Medicines may be prescribed to ease any discomfort or prevent infection. Take these as directed.  · Call your doctor if you have heavy bleeding or blood clots, burning that lasts more than a day, a fever over 100°F  (38° C), or trouble urinating.  Date Last Reviewed: 1/1/2017  © 9178-0723 [employer]. [address] [place] [address], [place] [address]. All rights reserved. This information is not intended as a substitute for professional medical care. Always follow your healthcare professional's instructions.

## 2020-09-22 NOTE — PROCEDURES
"Cystoscopy    Date/Time: 9/22/2020 8:40 AM  Performed by: Isaias Cheek MD  Authorized by: Isaias Cheek MD     Consent Done?:  Yes (Written)  Time out: Immediately prior to procedure a "time out" was called to verify the correct patient, procedure, equipment, support staff and site/side marked as required.    Indications: incontinence    Position:  Dorsal lithotomy  Anesthesia:  Intraurethral instillation  Patient sedated?: No    Preparation: Patient was prepped and draped in usual sterile fashion      Scope type:  Rigid cystoscope  Stent inserted: No    Stent removed: No    External exam normal: Yes    Digital exam performed: No    Urethra normal: extreme hypermobility with leakage.  Bladder neck normal: Bladder neck normal   Bladder normal: Yes      Patient tolerance:  Patient tolerated the procedure well with no immediate complications     Plan sling possible old sling removal and possible anterior repair      "

## 2020-09-23 LAB
ANION GAP SERPL CALC-SCNC: 8 MMOL/L (ref 3–11)
APPEARANCE, UA: CLEAR
BACTERIA SPEC CULT: ABNORMAL /HPF
BASOPHILS NFR SNV MANUAL: 0.5 % (ref 0–3)
BILIRUB UR QL STRIP: NEGATIVE MG/DL
BUN SERPL-MCNC: 26 MG/DL (ref 7–18)
BUN/CREAT SERPL: 23.85 RATIO (ref 7–18)
CALCIUM BLD-MCNC: POSITIVE MG/DL
CALCIUM SERPL-MCNC: 10.2 MG/DL (ref 8.8–10.5)
CHLORIDE SERPL-SCNC: 107 MMOL/L (ref 100–108)
CO2 SERPL-SCNC: 28 MMOL/L (ref 21–32)
COLOR UR: ABNORMAL
COVID-19 AB, IGM: NEGATIVE
CREAT SERPL-MCNC: 1.09 MG/DL (ref 0.55–1.02)
EOSINOPHIL NFR SNV MANUAL: 1.9 % (ref 1–3)
ERYTHROCYTE [DISTWIDTH] IN BLOOD BY AUTOMATED COUNT: 15 % (ref 12.5–18)
GFR ESTIMATION: 59
GLUCOSE (UA): NORMAL MG/DL
GLUCOSE SERPL-MCNC: 68 MG/DL (ref 70–110)
HCT VFR BLD AUTO: 42 % (ref 37–47)
HGB BLD-MCNC: 13.3 G/DL (ref 12–16)
HGB UR QL STRIP: 10 /UL
KETONES UR QL STRIP: NEGATIVE MG/DL
LEUKOCYTE ESTERASE UR QL STRIP: NEGATIVE /UL
LYMPHOCYTES NFR SNV MANUAL: 42 % (ref 25–40)
MANUAL NRBC PER 100 CELLS: 0 %
MCH RBC QN AUTO: 28.8 PG (ref 27–31.2)
MCHC RBC AUTO-ENTMCNC: 31.7 G/DL (ref 31.8–35.4)
MCV RBC AUTO: 90.9 FL (ref 80–97)
MONOCYTES/100 LEUKOCYTES: 12.8 % (ref 1–15)
NEUTROPHILS NFR BLD: 1.78 10*3/UL (ref 1.8–7.7)
NEUTROPHILS NFR SNV MANUAL: 42.3 % (ref 37–80)
NITRITE UR QL STRIP: NEGATIVE
PATIENT EMPLOYED IN HEALTHCARE: NO
PATIENT HAS SYMPTOMS FOR CONDITION OF INTEREST: NO
PATIENT HOSPITALIZED BC COND: NO
PATIENT IN CONGREGATE CARE: NO
PH UR STRIP: 5 PH (ref 5–9)
PLATELETS: 170 10*3/UL (ref 142–424)
POTASSIUM SERPL-SCNC: 3.9 MMOL/L (ref 3.6–5.2)
PROT UR QL STRIP: ABNORMAL MG/DL
RBC # BLD AUTO: 4.62 10*6/UL (ref 4.2–5.4)
RBC #/AREA URNS HPF: ABNORMAL /HPF (ref 0–2)
SERVICE COMMENT 03: ABNORMAL
SODIUM BLD-SCNC: 143 MMOL/L (ref 135–145)
SP GR UR STRIP: 1.02 (ref 1–1.03)
SPECIMEN COLLECTION METHOD, URINE: ABNORMAL
SQUAMOUS EPITHELIAL, UA: ABNORMAL /LPF
UROBILINOGEN UR STRIP-ACNC: NORMAL MG/DL
WBC # BLD: 4.2 10*3/UL (ref 4.6–10.2)
WBC #/AREA URNS HPF: ABNORMAL /HPF (ref 0–5)

## 2020-09-28 ENCOUNTER — OUTSIDE PLACE OF SERVICE (OUTPATIENT)
Dept: UROLOGY | Facility: CLINIC | Age: 75
End: 2020-09-28
Payer: COMMERCIAL

## 2020-09-28 PROCEDURE — 57240: ICD-10-PCS | Mod: 51,,, | Performed by: UROLOGY

## 2020-09-28 PROCEDURE — 57240 ANTERIOR COLPORRHAPHY: CPT | Mod: 51,,, | Performed by: UROLOGY

## 2020-09-28 PROCEDURE — 57288 REPAIR BLADDER DEFECT: CPT | Mod: ,,, | Performed by: UROLOGY

## 2020-09-28 PROCEDURE — 57288 PR SLING OPER STRES INCONTINENCE: ICD-10-PCS | Mod: ,,, | Performed by: UROLOGY

## 2020-10-01 ENCOUNTER — CLINICAL SUPPORT (OUTPATIENT)
Dept: UROLOGY | Facility: CLINIC | Age: 75
End: 2020-10-01
Payer: COMMERCIAL

## 2020-10-01 PROCEDURE — 99499 UNLISTED E&M SERVICE: CPT | Mod: S$GLB,,, | Performed by: UROLOGY

## 2020-10-01 PROCEDURE — 99499 NO LOS: ICD-10-PCS | Mod: S$GLB,,, | Performed by: UROLOGY

## 2020-10-01 NOTE — PROGRESS NOTES
..Voiding Trial conducted. Tolerated well. Educated on post op instructions, verbalized understanding. 130cc in up out put 100cc

## 2020-10-16 ENCOUNTER — OFFICE VISIT (OUTPATIENT)
Dept: UROLOGY | Facility: CLINIC | Age: 75
End: 2020-10-16
Payer: COMMERCIAL

## 2020-10-16 VITALS — RESPIRATION RATE: 18 BRPM | DIASTOLIC BLOOD PRESSURE: 88 MMHG | SYSTOLIC BLOOD PRESSURE: 139 MMHG

## 2020-10-16 DIAGNOSIS — N39.3 STRESS INCONTINENCE: Primary | ICD-10-CM

## 2020-10-16 LAB — POC RESIDUAL URINE VOLUME: 68 ML (ref 0–100)

## 2020-10-16 PROCEDURE — 99024 PR POST-OP FOLLOW-UP VISIT: ICD-10-PCS | Mod: S$GLB,,, | Performed by: UROLOGY

## 2020-10-16 PROCEDURE — 51798 POCT BLADDER SCAN: ICD-10-PCS | Mod: S$GLB,,, | Performed by: UROLOGY

## 2020-10-16 PROCEDURE — 51798 US URINE CAPACITY MEASURE: CPT | Mod: S$GLB,,, | Performed by: UROLOGY

## 2020-10-16 PROCEDURE — 99024 POSTOP FOLLOW-UP VISIT: CPT | Mod: S$GLB,,, | Performed by: UROLOGY

## 2020-10-16 NOTE — PROGRESS NOTES
Subjective:       Patient ID: Jennifer Young is a 75 y.o. female.    Chief Complaint: Follow-up (TVTO Sling with Anterior Repair 09/28/2020)      HPI: Fu sp sling--pt is still leaking       Past Medical History:   Past Medical History:   Diagnosis Date    Acid reflux     Carpal tunnel syndrome of left wrist     H/O knee surgery     Hypertension        Past Surgical Historical:   Past Surgical History:   Procedure Laterality Date    APPENDECTOMY      BACK SURGERY      BLADDER SUSPENSION      BLADDER SUSPENSION  09/28/2020    TVTO Sling with Anterior repair    HAND SURGERY      KNEE SURGERY      right replaced     SMALL INTESTINE SURGERY          Medications:   Medication List with Changes/Refills   Current Medications    ACETAMINOPHEN-CODEINE 300-30MG (TYLENOL #3) 300-30 MG TAB    TK 1 T PO  Q 4 H PRN P    AMLODIPINE (NORVASC) 5 MG TABLET    TK 1 T PO QAM    DIAZEPAM (VALIUM) 5 MG TABLET    TK 1 T PO  Q 8 H PRN FOR BLADDER SPASM    ESOMEPRAZOLE MAGNESIUM (NEXIUM) 10 MG SUSPENSION    Take 10 mg by mouth before breakfast.    FUROSEMIDE (LASIX) 20 MG TABLET    TK 1 T PO ONCE A DAY    LISINOPRIL (PRINIVIL,ZESTRIL) 5 MG TABLET    TK 1 T PO ONCE D    METHYLPREDNISOLONE (MEDROL DOSEPACK) 4 MG TABLET    FPD    MIRABEGRON (MYRBETRIQ) 50 MG TB24    Take 1 tablet (50 mg total) by mouth once daily.    NAPROXEN (NAPROSYN) 500 MG TABLET    TK 1 T PO  BID    NITROFURANTOIN, MACROCRYSTAL-MONOHYDRATE, (MACROBID) 100 MG CAPSULE    TK ONE C PO BID    OXYBUTYNIN (DITROPAN-XL) 5 MG TR24    Take 1 tablet (5 mg total) by mouth once daily.    OXYCODONE-ACETAMINOPHEN (PERCOCET) 5-325 MG PER TABLET    TK 1 T PO Q 6 H PRN P        Past Social History:   Social History     Socioeconomic History    Marital status:      Spouse name: Not on file    Number of children: Not on file    Years of education: Not on file    Highest education level: Not on file   Occupational History    Not on file   Social Needs     Financial resource strain: Not on file    Food insecurity     Worry: Not on file     Inability: Not on file    Transportation needs     Medical: Not on file     Non-medical: Not on file   Tobacco Use    Smoking status: Never Smoker   Substance and Sexual Activity    Alcohol use: Not on file    Drug use: Never    Sexual activity: Not Currently   Lifestyle    Physical activity     Days per week: Not on file     Minutes per session: Not on file    Stress: Not on file   Relationships    Social connections     Talks on phone: Not on file     Gets together: Not on file     Attends Orthodoxy service: Not on file     Active member of club or organization: Not on file     Attends meetings of clubs or organizations: Not on file     Relationship status: Not on file   Other Topics Concern    Not on file   Social History Narrative    Not on file       Allergies: Review of patient's allergies indicates:  No Known Allergies     Family History:   Family History   Problem Relation Age of Onset    Cancer Father     Lung cancer Mother     Breast cancer Sister         Review of Systems:  Review of Systems    Physical Exam:  Physical Exam   Genitourinary:               ua pending    Assessment/Plan:     sp sling will have timed voiding and double voiding fu in 3 weeks  Problem List Items Addressed This Visit     None      Visit Diagnoses     Stress incontinence    -  Primary    Relevant Orders    POCT Urinalysis (w/Micro Option)    POCT Bladder Scan

## 2020-11-09 ENCOUNTER — OFFICE VISIT (OUTPATIENT)
Dept: UROLOGY | Facility: CLINIC | Age: 75
End: 2020-11-09
Payer: COMMERCIAL

## 2020-11-09 DIAGNOSIS — N39.3 STRESS INCONTINENCE: Primary | ICD-10-CM

## 2020-11-09 LAB — POC RESIDUAL URINE VOLUME: 10 ML (ref 0–100)

## 2020-11-09 PROCEDURE — 99214 PR OFFICE/OUTPT VISIT, EST, LEVL IV, 30-39 MIN: ICD-10-PCS | Mod: 25,S$GLB,, | Performed by: UROLOGY

## 2020-11-09 PROCEDURE — 51798 POCT BLADDER SCAN: ICD-10-PCS | Mod: S$GLB,,, | Performed by: UROLOGY

## 2020-11-09 PROCEDURE — 51798 US URINE CAPACITY MEASURE: CPT | Mod: S$GLB,,, | Performed by: UROLOGY

## 2020-11-09 PROCEDURE — 1159F PR MEDICATION LIST DOCUMENTED IN MEDICAL RECORD: ICD-10-PCS | Mod: S$GLB,,, | Performed by: UROLOGY

## 2020-11-09 PROCEDURE — 99214 OFFICE O/P EST MOD 30 MIN: CPT | Mod: 25,S$GLB,, | Performed by: UROLOGY

## 2020-11-09 PROCEDURE — 1159F MED LIST DOCD IN RCRD: CPT | Mod: S$GLB,,, | Performed by: UROLOGY

## 2020-11-09 RX ORDER — OXYBUTYNIN CHLORIDE 5 MG/1
5 TABLET ORAL 3 TIMES DAILY
Qty: 90 TABLET | Refills: 11 | Status: SHIPPED | OUTPATIENT
Start: 2020-11-09 | End: 2021-11-09

## 2020-11-09 NOTE — LETTER
November 9, 2020        Seng Jose MD  [address]  [address]  [place] [address]             [facility]  [address] [address]  Phone: [phone]  Fax: [phone]   Patient: Jennifer Young   MR Number: [id number]   YOB: 1945   Date of Visit: 11/9/2020       Dear Dr. Jose:    Thank you for referring Jennifer Young to me for evaluation. Below are the relevant portions of my assessment and plan of care.            If you have questions, please do not hesitate to call me. I look forward to following Jennifer along with you.    Sincerely,      Isaias Cheek MD           CC  No Recipients

## 2020-11-09 NOTE — PROGRESS NOTES
Pt seen chart reviewed case discussed with Victor Hugo.  Agree with plan to add qhs ditropan.  I also advised pt to reduce evening fluids and salt.  Pt will fu in 6 months.

## 2020-11-09 NOTE — PROGRESS NOTES
Subjective:       Patient ID: Jennifer Young is a 75 y.o. female.    Chief Complaint: Other (4 week sling fu )      HPI: HPI     Past Medical History:   Past Medical History:   Diagnosis Date    Acid reflux     Carpal tunnel syndrome of left wrist     H/O knee surgery     Hypertension        Past Surgical Historical:   Past Surgical History:   Procedure Laterality Date    APPENDECTOMY      BACK SURGERY      BLADDER SUSPENSION      BLADDER SUSPENSION  09/28/2020    TVTO Sling with Anterior repair    HAND SURGERY      KNEE SURGERY      right replaced     SMALL INTESTINE SURGERY          Medications:   Medication List with Changes/Refills   New Medications    OXYBUTYNIN (DITROPAN) 5 MG TAB    Take 1 tablet (5 mg total) by mouth 3 (three) times daily.   Current Medications    ESOMEPRAZOLE MAGNESIUM (NEXIUM) 10 MG SUSPENSION    Take 10 mg by mouth before breakfast.   Discontinued Medications    ACETAMINOPHEN-CODEINE 300-30MG (TYLENOL #3) 300-30 MG TAB    TK 1 T PO  Q 4 H PRN P    AMLODIPINE (NORVASC) 5 MG TABLET    TK 1 T PO QAM    DIAZEPAM (VALIUM) 5 MG TABLET    TK 1 T PO  Q 8 H PRN FOR BLADDER SPASM    FUROSEMIDE (LASIX) 20 MG TABLET    TK 1 T PO ONCE A DAY    LISINOPRIL (PRINIVIL,ZESTRIL) 5 MG TABLET    TK 1 T PO ONCE D    METHYLPREDNISOLONE (MEDROL DOSEPACK) 4 MG TABLET    FPD    MIRABEGRON (MYRBETRIQ) 50 MG TB24    Take 1 tablet (50 mg total) by mouth once daily.    NAPROXEN (NAPROSYN) 500 MG TABLET    TK 1 T PO  BID    NITROFURANTOIN, MACROCRYSTAL-MONOHYDRATE, (MACROBID) 100 MG CAPSULE    TK ONE C PO BID    OXYBUTYNIN (DITROPAN-XL) 5 MG TR24    Take 1 tablet (5 mg total) by mouth once daily.    OXYCODONE-ACETAMINOPHEN (PERCOCET) 5-325 MG PER TABLET    TK 1 T PO Q 6 H PRN P        Past Social History:   Social History     Socioeconomic History    Marital status:      Spouse name: Not on file    Number of children: Not on file    Years of education: Not on file    Highest education  level: Not on file   Occupational History    Not on file   Social Needs    Financial resource strain: Not on file    Food insecurity     Worry: Not on file     Inability: Not on file    Transportation needs     Medical: Not on file     Non-medical: Not on file   Tobacco Use    Smoking status: Never Smoker   Substance and Sexual Activity    Alcohol use: Not on file    Drug use: Never    Sexual activity: Not Currently   Lifestyle    Physical activity     Days per week: Not on file     Minutes per session: Not on file    Stress: Not on file   Relationships    Social connections     Talks on phone: Not on file     Gets together: Not on file     Attends Adventist service: Not on file     Active member of club or organization: Not on file     Attends meetings of clubs or organizations: Not on file     Relationship status: Not on file   Other Topics Concern    Not on file   Social History Narrative    Not on file       Allergies: Review of patient's allergies indicates:  No Known Allergies     Family History:   Family History   Problem Relation Age of Onset    Cancer Father     Lung cancer Mother     Breast cancer Sister         Review of Systems:  Review of Systems    Physical Exam:  Physical Exam    Assessment/Plan:       Problem List Items Addressed This Visit     None      Visit Diagnoses     Stress incontinence    -  Primary    Relevant Orders    POCT Bladder Scan    POCT Urinalysis (w/Micro Option)

## 2020-11-09 NOTE — PROGRESS NOTES
Subjective:       Patient ID: Jennifer Young is a 75 y.o. female.    Chief Complaint: Other (4 week sling fu )      HPI: 75-year-old female known service Dr. Cheek status post sling in September 2020.  She was seen 3 weeks ago by Dr. Cheek she had a good support no leakage.  She returns today is a 4 week follow-up again she is free of stress incontinence but reporting urgency urge incontinence.  She was on oxybutynin the past but no longer takes.  She denies any dysuria, gross hematuria pain with urination.  She feels empty to completion.  Currently she is having some mild diarrhea, followed by her PCP       Past Medical History:   Past Medical History:   Diagnosis Date    Acid reflux     Carpal tunnel syndrome of left wrist     H/O knee surgery     Hypertension        Past Surgical Historical:   Past Surgical History:   Procedure Laterality Date    APPENDECTOMY      BACK SURGERY      BLADDER SUSPENSION      BLADDER SUSPENSION  09/28/2020    TVTO Sling with Anterior repair    HAND SURGERY      KNEE SURGERY      right replaced     SMALL INTESTINE SURGERY          Medications:   Medication List with Changes/Refills   New Medications    OXYBUTYNIN (DITROPAN) 5 MG TAB    Take 1 tablet (5 mg total) by mouth 3 (three) times daily.   Current Medications    ESOMEPRAZOLE MAGNESIUM (NEXIUM) 10 MG SUSPENSION    Take 10 mg by mouth before breakfast.   Discontinued Medications    ACETAMINOPHEN-CODEINE 300-30MG (TYLENOL #3) 300-30 MG TAB    TK 1 T PO  Q 4 H PRN P    AMLODIPINE (NORVASC) 5 MG TABLET    TK 1 T PO QAM    DIAZEPAM (VALIUM) 5 MG TABLET    TK 1 T PO  Q 8 H PRN FOR BLADDER SPASM    FUROSEMIDE (LASIX) 20 MG TABLET    TK 1 T PO ONCE A DAY    LISINOPRIL (PRINIVIL,ZESTRIL) 5 MG TABLET    TK 1 T PO ONCE D    METHYLPREDNISOLONE (MEDROL DOSEPACK) 4 MG TABLET    FPD    MIRABEGRON (MYRBETRIQ) 50 MG TB24    Take 1 tablet (50 mg total) by mouth once daily.    NAPROXEN (NAPROSYN) 500 MG TABLET    TK 1 T  PO  BID    NITROFURANTOIN, MACROCRYSTAL-MONOHYDRATE, (MACROBID) 100 MG CAPSULE    TK ONE C PO BID    OXYBUTYNIN (DITROPAN-XL) 5 MG TR24    Take 1 tablet (5 mg total) by mouth once daily.    OXYCODONE-ACETAMINOPHEN (PERCOCET) 5-325 MG PER TABLET    TK 1 T PO Q 6 H PRN P        Past Social History:   Social History     Socioeconomic History    Marital status:      Spouse name: Not on file    Number of children: Not on file    Years of education: Not on file    Highest education level: Not on file   Occupational History    Not on file   Social Needs    Financial resource strain: Not on file    Food insecurity     Worry: Not on file     Inability: Not on file    Transportation needs     Medical: Not on file     Non-medical: Not on file   Tobacco Use    Smoking status: Never Smoker   Substance and Sexual Activity    Alcohol use: Not on file    Drug use: Never    Sexual activity: Not Currently   Lifestyle    Physical activity     Days per week: Not on file     Minutes per session: Not on file    Stress: Not on file   Relationships    Social connections     Talks on phone: Not on file     Gets together: Not on file     Attends Yarsanism service: Not on file     Active member of club or organization: Not on file     Attends meetings of clubs or organizations: Not on file     Relationship status: Not on file   Other Topics Concern    Not on file   Social History Narrative    Not on file       Allergies: Review of patient's allergies indicates:  No Known Allergies     Family History:   Family History   Problem Relation Age of Onset    Cancer Father     Lung cancer Mother     Breast cancer Sister         Review of Systems:  Review of Systems   Constitutional: Negative for activity change and appetite change.   HENT: Negative for congestion and dental problem.    Respiratory: Negative for chest tightness and shortness of breath.    Cardiovascular: Negative for chest pain.   Gastrointestinal: Negative  for abdominal distention and abdominal pain.   Genitourinary: Positive for urgency. Negative for decreased urine volume, difficulty urinating, dyspareunia, dysuria, enuresis, flank pain, frequency, genital sores, hematuria and pelvic pain.   Musculoskeletal: Negative for back pain and neck pain.   Allergic/Immunologic: Negative for immunocompromised state.   Neurological: Negative for dizziness.   Hematological: Negative for adenopathy.   Psychiatric/Behavioral: Negative for agitation, behavioral problems and confusion.       Physical Exam:  Physical Exam  Constitutional:       Appearance: She is well-developed.   HENT:      Head: Normocephalic and atraumatic.   Eyes:      General: No scleral icterus.  Neck:      Musculoskeletal: Normal range of motion.   Pulmonary:      Effort: Pulmonary effort is normal.      Breath sounds: Normal breath sounds.   Abdominal:      General: There is no distension.      Palpations: Abdomen is soft.      Tenderness: There is no abdominal tenderness.   Genitourinary:     Exam position: Supine.      Labia:         Right: No rash, tenderness or lesion.         Left: No rash, tenderness or lesion.       Vagina: Normal.      Rectum: Normal.      Comments: Pelvic exam--PVR 10 mL.  She has good support anterior/posterior with no hypermobility of the  bladder neck  Skin:     General: Skin is warm and dry.   Neurological:      Mental Status: She is alert and oriented to person, place, and time.         Assessment/Plan:   Stress incontinence--status post sling with good outcome.    Urgency urge incontinence--restart oxybutynin 5 mg 1 p.o.1 hr prior to bedtime.  The restrict up p.m. fluid intake.  Urinalysis today WBC 3-5, no rbc's, 4+ skin cells 3+ bacteria obvious contaminant.  Patient asymptomatic  Problem List Items Addressed This Visit     None      Visit Diagnoses     Stress incontinence    -  Primary    Relevant Orders    POCT Bladder Scan    POCT Urinalysis (w/Micro Option)

## 2021-04-13 ENCOUNTER — OFFICE VISIT (OUTPATIENT)
Dept: UROLOGY | Facility: CLINIC | Age: 76
End: 2021-04-13
Payer: COMMERCIAL

## 2021-04-13 VITALS
BODY MASS INDEX: 40.8 KG/M2 | SYSTOLIC BLOOD PRESSURE: 144 MMHG | HEIGHT: 64 IN | HEART RATE: 60 BPM | WEIGHT: 239 LBS | DIASTOLIC BLOOD PRESSURE: 65 MMHG

## 2021-04-13 DIAGNOSIS — R39.15 URINARY URGENCY: ICD-10-CM

## 2021-04-13 DIAGNOSIS — N39.41 URGE INCONTINENCE: ICD-10-CM

## 2021-04-13 DIAGNOSIS — R33.9 INCOMPLETE BLADDER EMPTYING: ICD-10-CM

## 2021-04-13 DIAGNOSIS — N39.3 STRESS INCONTINENCE: Primary | ICD-10-CM

## 2021-04-13 PROCEDURE — 99214 PR OFFICE/OUTPT VISIT, EST, LEVL IV, 30-39 MIN: ICD-10-PCS | Mod: S$GLB,,, | Performed by: NURSE PRACTITIONER

## 2021-04-13 PROCEDURE — 1159F PR MEDICATION LIST DOCUMENTED IN MEDICAL RECORD: ICD-10-PCS | Mod: S$GLB,,, | Performed by: NURSE PRACTITIONER

## 2021-04-13 PROCEDURE — 1159F MED LIST DOCD IN RCRD: CPT | Mod: S$GLB,,, | Performed by: NURSE PRACTITIONER

## 2021-04-13 PROCEDURE — 99214 OFFICE O/P EST MOD 30 MIN: CPT | Mod: S$GLB,,, | Performed by: NURSE PRACTITIONER

## 2021-04-13 RX ORDER — METOPROLOL TARTRATE 25 MG/1
25 TABLET, FILM COATED ORAL DAILY
COMMUNITY
Start: 2021-03-27

## 2021-04-13 RX ORDER — NAPROXEN 250 MG/1
TABLET ORAL
COMMUNITY
Start: 2021-03-09

## 2021-04-21 ENCOUNTER — PROCEDURE VISIT (OUTPATIENT)
Dept: UROLOGY | Facility: CLINIC | Age: 76
End: 2021-04-21
Payer: COMMERCIAL

## 2021-04-21 VITALS
HEART RATE: 74 BPM | DIASTOLIC BLOOD PRESSURE: 87 MMHG | OXYGEN SATURATION: 99 % | RESPIRATION RATE: 18 BRPM | HEIGHT: 64 IN | SYSTOLIC BLOOD PRESSURE: 168 MMHG | WEIGHT: 242.25 LBS | BODY MASS INDEX: 41.36 KG/M2

## 2021-04-21 DIAGNOSIS — N39.41 URGE INCONTINENCE: ICD-10-CM

## 2021-04-21 DIAGNOSIS — R39.15 URINARY URGENCY: ICD-10-CM

## 2021-04-21 DIAGNOSIS — R33.9 INCOMPLETE BLADDER EMPTYING: ICD-10-CM

## 2021-04-21 DIAGNOSIS — N39.3 STRESS INCONTINENCE: Primary | ICD-10-CM

## 2021-04-21 PROCEDURE — 52000 CYSTOSCOPY: ICD-10-PCS | Mod: S$GLB,,, | Performed by: UROLOGY

## 2021-04-21 PROCEDURE — 52000 CYSTOURETHROSCOPY: CPT | Mod: S$GLB,,, | Performed by: UROLOGY

## 2021-04-21 RX ORDER — CIPROFLOXACIN 500 MG/1
500 TABLET ORAL
Status: COMPLETED | OUTPATIENT
Start: 2021-04-21 | End: 2021-04-21

## 2021-04-21 RX ORDER — AMPICILLIN 500 MG/1
500 CAPSULE ORAL
Status: COMPLETED | OUTPATIENT
Start: 2021-04-21 | End: 2021-04-21

## 2021-04-21 RX ADMIN — AMPICILLIN 500 MG: 500 CAPSULE ORAL at 09:04

## 2021-04-21 RX ADMIN — CIPROFLOXACIN 500 MG: 500 TABLET ORAL at 08:04
